# Patient Record
Sex: MALE | Race: WHITE | URBAN - METROPOLITAN AREA
[De-identification: names, ages, dates, MRNs, and addresses within clinical notes are randomized per-mention and may not be internally consistent; named-entity substitution may affect disease eponyms.]

---

## 2020-10-13 ENCOUNTER — OFFICE VISIT (OUTPATIENT)
Dept: PRIMARY CARE CLINIC | Age: 60
End: 2020-10-13
Payer: COMMERCIAL

## 2020-10-13 PROCEDURE — 99211 OFF/OP EST MAY X REQ PHY/QHP: CPT | Performed by: NURSE PRACTITIONER

## 2020-10-13 NOTE — PATIENT INSTRUCTIONS

## 2020-10-15 LAB
SARS-COV-2: DETECTED
SOURCE: ABNORMAL

## 2020-10-18 NOTE — PROGRESS NOTES
Nahum Marquez   62 y.o. male   1960    HPI:  Chief Complaint   Patient presents with    Established New Doctor    Nausea     Started 10/4/20.  Diarrhea    Fever     Low grade.  Concern For COVID-19    Covid Testing     Tested positive. Pt tested on Tuesday, 10/13. Virtual visit encounter type:   [x] Doxy. me  [] Telephone w/o video  [] MyChart  [] Other: This is patient's first visit with me to establish care as their new PCP. He has no significant PMH and has not had a PCP in a very long time. Of note, he tested positive for COVID-19 on 10/13/2020. He went to one of [facility]) COVID-19 testing centers. Has no idea where he contracted it from. Around 10/4/2020, he started feeling sick. He stated that around this time of the year, he has sinus issues and thought it was another episode. He reported of right side swollen gland of his neck and low grade fever of 99 F intermittently. Last week his symptoms worsened and started having higher fever of low 100 F, abdominal cramping, and non-bloody diarrhea.      -Has been fever free for about 4 days.  -Diarrhea also stopped around 4 days.  -Reported of losing 8 lbs    He overall feels much better with mild residual intermittent abdominal cramping. Despite his symptoms, he has been able to work from home. He works from [employer] Only person in his household is his wife and she tested negative for COVID-19 and had little to no symptoms. PDMP report revealed no controlled meds of any kind. No Known Allergies    Current Outpatient Medications on File Prior to Visit   Medication Sig Dispense Refill    fluticasone (FLONASE) 50 MCG/ACT nasal spray       halobetasol (ULTRAVATE) 0.05 % cream APPLY TWICE A DAY AS NEEDED **[facility] NOT CONTRACTED W/[facility]**       No current facility-administered medications on file prior to visit.        Family History   Problem Relation Age of Onset    Other Mother         obesity    High Blood discharge. Extraocular Movements: Extraocular movements intact. Conjunctiva/sclera: Conjunctivae normal.   Neck:      Musculoskeletal: Normal range of motion. No erythema. Pulmonary:      Effort: Pulmonary effort is normal. No respiratory distress. Breath sounds: No stridor. No wheezing. Abdominal:      General: There is no distension. Musculoskeletal: Normal range of motion. Skin:     Coloration: Skin is not cyanotic, jaundiced or pale. Findings: No erythema. Neurological:      General: No focal deficit present. Mental Status: He is alert and oriented to person, place, and time. Mental status is at baseline. Psychiatric:         Attention and Perception: Attention and perception normal.         Mood and Affect: Mood and affect normal.         Speech: Speech normal.         Behavior: Behavior normal. Behavior is cooperative. Thought Content: Thought content normal.        Assessment/Plan:       ICD-10-CM    1. Encounter to establish care with new doctor  Z76.89    2. COVID-19 virus infection  U07.1       -Patient is now essentially back to his baseline and is asymptomatic.  -Patient stated that he does not need a doctor's note to return to work. -Based on his clinical course, I informed patient that I doubt he will have residual/lingering symptoms that some patients have had, who were infected with COVID-19.  -Recommended getting flu shot next month or so when he's asymptomatic. Educational information was provided to the patient regarding the signs and symptoms associated with COVID-19. Patient was also made aware that COVID-19 is highly contagious and is mainly contracted by being in close proximity (via respiratory droplets) to an infected person with COVID-19 and that the virus can be transmitted even if one is asymptomatic. Patient was also made aware that the virus can be transmitted via oral/body secretions.   Patient was advised to wear a mask and gloves especially in large public settings (e.g. grocery store) and maintain social distancing (> 6 feet apart) as much as possible. Patient was informed if one has any symptoms consistent with COVID-19 were to develop or if one has been exposed to a person, who tested positive for COVID-19 to go to one of [facility]'s nearest flu clinics to be evaluated and possibly be tested for COVID-19 (based on the discretion of the healthcare evaluator) or seek care with their PCP (via telemedicine), urgent care, or ER. Results may take 3-5 days (or even longer) depending on where the patient was tested. The patient was advised to make arrangements with family and/or friends to obtaining basic necessities (eg groceries) and that it is recommended that no one visit within the household to prevent possible exposure and transmission to others. Based on current CDC guidelines, at least 24 hours of being afebrile as well as improvement of overall symptoms is highly recommended before returning to work and minimum of 7 days (or longer) has elapsed since symptoms first started. Patient was also informed that repeat COVID-19 testing for most people is not necessary. Regarding the recovery process, the patient was informed that each person is different and for those with mild presentation of COVID-19 may recover within 2 to 3 weeks of infection while there is with moderate and more severe cases of COVID-19 may take well over 3 weeks or even months to recover. The patient was encouraged to follow-up as directed with their PCP. 3. Diarrhea due to COVID-19  U07.1     A08.39     Patient mainly had GI symptoms, which have resolved. I reviewed the plan of care with the patient. Patient acknowledged understanding and agreed with plan of care overall.     Medications Discontinued During This Encounter   Medication Reason    Fexofenadine HCl (ALLEGRA PO) LIST CLEANUP    permethrin (ELIMITE) 5 % cream LIST CLEANUP        Patient was informed that whether starting or adding a new medication or increasing the dose of a current medication, the benefits and risks have to be considered and weighed over. Patient was also informed that there are no medications that have no side effects and if a side effect were to occur with starting a new medication or with increasing the dose of a current medication that either the medication can be totally discontinued altogether or simply decrease the dose of it and based on this, a follow-up appointment is necessary. The drug allergy list will then be updated with the corresponding side effects if it's deemed to be a true 'drug allergy'. The most common adverse effects of medication(s) were addressed at today's visit. Lastly, the patient was informed that the coverage status of a medication may vary from insurance to insurance and the only way to verify if the medication is covered is to send an actual prescription in. The patient was also informed that the cost of medications vary from insurance to insurance. Estimated length of time of today's visit: 30 minutes - discussed at length about COVID-19    PDMP Monitoring:   Last PDMP Son as Reviewed [facility]):  Review User Review Instant Review Result   [address]ls CHRISTIAN 10/18/2020  5:09 AM Reviewed PDMP [1]       Follow-up: Return if symptoms worsen or fail to improve. . Patient was informed that if his or her symptoms worsen to return here or go to nearest ER if symptoms significantly worsen. Disclaimer:  Cruz Powell is a 62 y.o. male is being evaluated by a virtual visit (video visit) and/or telephone (without video) encounter to address their concern(s) as mentioned above. Prior to arranging this appointment, the patient was made aware of the need and importance to schedule the visit in this manner given the current ongoing COVID-19 pandemic. The patient was also made aware that the telemedicine service used (e.g.doxy. me) would not save let alone record any information (audio, video, and text) regarding the actual virtual visit. After this discussion, the patient acknowledged understanding and agreed to today's virtual visit encounter. A caregiver was present when appropriate as well as an  if requested. Due to this being a TeleHealth encounter (during the KSGFY-09 public health emergency), evaluation of the following organ systems was overall limited: Vitals/Constitutional/EENT/Resp/CV/GI//MS/Neuro/Skin/Heme-Lymph-Imm. As a result, establishing a diagnosis(s) and formulating a plan of care may be overall more difficult and complicated compared to a conventional (face-to-face) office visit. The patient was made aware about the potential limitations and difficulties of a telemedicine visit such as having technical difficulties related to video and/or audio issues often stemming from a sub-optimal/poor Internet or cellular data connection and/or other factors. If this is judged to be the case then the patient would be informed if deemed relevant and necessary that an in-person follow-up visit may be recommended. Pursuant to the emergency declaration under the [address] [place] [address], [address] [place] [address] authority and the [employer] and [employer] Act, this virtual visit was conducted with the patient's (and/or legal guardian's) consent, to reduce the patient's risk (as well as others) of exposure to COVID-19 and to continue to maintain and provide necessary medical care. The patient (and/or legal guardian) has also been advised to contact this office for worsening conditions or problems, and seek emergency medical treatment and/or call 911 if deemed necessary. Services were provided through either a video synchronous discussion virtually or via telephone (without video) or combination of both to substitute for in-person clinic visit.  Patient and provider were located at their individual home(s) or their most convenient location at the time of visit. Hector Cerna MD on 10/20/2020 at 10:00 [address]    An electronic signature was used to authenticate this note.

## 2020-10-19 ENCOUNTER — VIRTUAL VISIT (OUTPATIENT)
Dept: FAMILY MEDICINE CLINIC | Age: 60
End: 2020-10-19
Payer: COMMERCIAL

## 2020-10-19 PROCEDURE — 99203 OFFICE O/P NEW LOW 30 MIN: CPT | Performed by: FAMILY MEDICINE

## 2020-10-19 RX ORDER — DIPHENOXYLATE HYDROCHLORIDE AND ATROPINE SULFATE 2.5; .025 MG/1; MG/1
1 TABLET ORAL EVERY 8 HOURS PRN
Qty: 15 TABLET | Refills: 0 | Status: CANCELLED | OUTPATIENT
Start: 2020-10-19 | End: 2020-10-24

## 2020-10-19 RX ORDER — DICYCLOMINE HYDROCHLORIDE 10 MG/1
10 CAPSULE ORAL EVERY 6 HOURS PRN
Qty: 30 CAPSULE | Refills: 2 | Status: CANCELLED | OUTPATIENT
Start: 2020-10-19

## 2020-10-19 RX ORDER — ERGOCALCIFEROL 1.25 MG/1
50000 CAPSULE ORAL WEEKLY
Qty: 4 CAPSULE | Refills: 0 | Status: CANCELLED | OUTPATIENT
Start: 2020-10-19

## 2020-10-19 RX ORDER — BENZONATATE 100 MG/1
100 CAPSULE ORAL 3 TIMES DAILY PRN
Qty: 15 CAPSULE | Refills: 1 | Status: CANCELLED | OUTPATIENT
Start: 2020-10-19

## 2020-10-19 RX ORDER — ALBUTEROL SULFATE 90 UG/1
2 AEROSOL, METERED RESPIRATORY (INHALATION) EVERY 6 HOURS PRN
Qty: 1 INHALER | Refills: 0 | Status: CANCELLED | OUTPATIENT
Start: 2020-10-19

## 2020-10-19 RX ORDER — FLUTICASONE PROPIONATE 50 MCG
SPRAY, SUSPENSION (ML) NASAL
COMMUNITY
Start: 1999-10-01

## 2020-10-19 ASSESSMENT — PATIENT HEALTH QUESTIONNAIRE - PHQ9
SUM OF ALL RESPONSES TO PHQ QUESTIONS 1-9: 0
2. FEELING DOWN, DEPRESSED OR HOPELESS: 0
SUM OF ALL RESPONSES TO PHQ QUESTIONS 1-9: 0
SUM OF ALL RESPONSES TO PHQ9 QUESTIONS 1 & 2: 0
1. LITTLE INTEREST OR PLEASURE IN DOING THINGS: 0
SUM OF ALL RESPONSES TO PHQ QUESTIONS 1-9: 0

## 2020-10-19 NOTE — PATIENT INSTRUCTIONS
Patient Education        10 Things to Do When You Have COVID-19    Stay home. Don't go to school, work, or public areas. And don't use public transportation, ride-shares, or taxis unless you have no choice. Leave your home only if you need to get medical care. But call the doctor's office first so they know you're coming. And wear a cloth face cover. Ask before leaving isolation. Talk with your doctor or other health professional about when it will be safe for you to leave isolation. Wear a cloth face cover when you are around other people. It can help stop the spread of the virus when you cough or sneeze. Limit contact with people in your home. If possible, stay in a separate bedroom and use a separate bathroom. Avoid contact with pets and other animals. If possible, have a friend or family member care for them while you're sick. Cover your mouth and nose with a tissue when you cough or sneeze. Then throw the tissue in the trash right away. Wash your hands often, especially after you cough or sneeze. Use soap and water, and scrub for at least 20 seconds. If soap and water aren't available, use an alcohol-based hand . Don't share personal household items. These include bedding, towels, cups and glasses, and eating utensils. Clean and disinfect your home every day. Use household  or disinfectant wipes or sprays. Take special care to clean things that you grab with your hands. These include doorknobs, remote controls, phones, and handles on your refrigerator and microwave. And don't forget countertops, tabletops, bathrooms, and computer keyboards. Take acetaminophen (Tylenol) to relieve fever and body aches. Read and follow all instructions on the label. Current as of: July 10, 2020               Content Version: 12.6  © 2006-2020 [employer]. Care instructions adapted under license by [facility].  If you have questions about a medical condition or this instruction, always ask your healthcare professional. [place] [address] any warranty or liability for your use of this information.

## 2020-10-20 ASSESSMENT — ENCOUNTER SYMPTOMS
COUGH: 0
RHINORRHEA: 0
ABDOMINAL PAIN: 1
TROUBLE SWALLOWING: 0
SINUS PRESSURE: 0
CHEST TIGHTNESS: 0
SHORTNESS OF BREATH: 0
CONSTIPATION: 0
WHEEZING: 0
VOMITING: 0
DIARRHEA: 0
ABDOMINAL DISTENTION: 0
NAUSEA: 0
BLOOD IN STOOL: 0
BACK PAIN: 0

## 2025-02-05 ENCOUNTER — HOSPITAL ENCOUNTER (EMERGENCY)
Age: 65
Discharge: LEFT BEFORE DISCHARGE | End: 2025-02-06
Attending: STUDENT IN AN ORGANIZED HEALTH CARE EDUCATION/TRAINING PROGRAM | Admitting: INTERNAL MEDICINE
Payer: COMMERCIAL

## 2025-02-05 ENCOUNTER — APPOINTMENT (OUTPATIENT)
Dept: CT IMAGING | Age: 65
End: 2025-02-05
Payer: COMMERCIAL

## 2025-02-05 VITALS
DIASTOLIC BLOOD PRESSURE: 94 MMHG | SYSTOLIC BLOOD PRESSURE: 126 MMHG | RESPIRATION RATE: 21 BRPM | OXYGEN SATURATION: 99 % | HEART RATE: 68 BPM | TEMPERATURE: 98.8 F

## 2025-02-05 DIAGNOSIS — K92.2 GASTROINTESTINAL HEMORRHAGE, UNSPECIFIED GASTROINTESTINAL HEMORRHAGE TYPE: Primary | ICD-10-CM

## 2025-02-05 LAB
ABO + RH BLD: NORMAL
ALBUMIN SERPL-MCNC: 3.9 G/DL (ref 3.4–5)
ALBUMIN/GLOB SERPL: 1.6 {RATIO} (ref 1.1–2.2)
ALP SERPL-CCNC: 54 U/L (ref 40–129)
ALT SERPL-CCNC: 13 U/L (ref 10–40)
ANION GAP SERPL CALCULATED.3IONS-SCNC: 7 MMOL/L (ref 3–16)
AST SERPL-CCNC: 22 U/L (ref 15–37)
BASOPHILS # BLD: 0.1 K/UL (ref 0–0.2)
BASOPHILS NFR BLD: 0.8 %
BILIRUB SERPL-MCNC: 0.5 MG/DL (ref 0–1)
BLD GP AB SCN SERPL QL: NORMAL
BUN SERPL-MCNC: 14 MG/DL (ref 7–20)
CALCIUM SERPL-MCNC: 8.9 MG/DL (ref 8.3–10.6)
CHLORIDE SERPL-SCNC: 106 MMOL/L (ref 99–110)
CO2 SERPL-SCNC: 28 MMOL/L (ref 21–32)
CREAT SERPL-MCNC: 0.7 MG/DL (ref 0.8–1.3)
DEPRECATED RDW RBC AUTO: 13.4 % (ref 12.4–15.4)
EOSINOPHIL # BLD: 0.2 K/UL (ref 0–0.6)
EOSINOPHIL NFR BLD: 1.3 %
GFR SERPLBLD CREATININE-BSD FMLA CKD-EPI: >90 ML/MIN/{1.73_M2}
GLUCOSE SERPL-MCNC: 112 MG/DL (ref 70–99)
HCT VFR BLD AUTO: 44.8 % (ref 40.5–52.5)
HEMOCCULT STL QL: ABNORMAL
HGB BLD-MCNC: 15 G/DL (ref 13.5–17.5)
INR PPP: 1.07 (ref 0.85–1.15)
LYMPHOCYTES # BLD: 1.9 K/UL (ref 1–5.1)
LYMPHOCYTES NFR BLD: 12.9 %
MCH RBC QN AUTO: 28.2 PG (ref 26–34)
MCHC RBC AUTO-ENTMCNC: 33.5 G/DL (ref 31–36)
MCV RBC AUTO: 84.2 FL (ref 80–100)
MONOCYTES # BLD: 1.2 K/UL (ref 0–1.3)
MONOCYTES NFR BLD: 8.2 %
NEUTROPHILS # BLD: 11 K/UL (ref 1.7–7.7)
NEUTROPHILS NFR BLD: 76.8 %
PLATELET # BLD AUTO: 235 K/UL (ref 135–450)
PMV BLD AUTO: 8.9 FL (ref 5–10.5)
POTASSIUM SERPL-SCNC: 4.4 MMOL/L (ref 3.5–5.1)
PROT SERPL-MCNC: 6.4 G/DL (ref 6.4–8.2)
PROTHROMBIN TIME: 14.1 SEC (ref 11.9–14.9)
RBC # BLD AUTO: 5.32 M/UL (ref 4.2–5.9)
SODIUM SERPL-SCNC: 141 MMOL/L (ref 136–145)
WBC # BLD AUTO: 14.4 K/UL (ref 4–11)

## 2025-02-05 PROCEDURE — 85025 COMPLETE CBC W/AUTO DIFF WBC: CPT

## 2025-02-05 PROCEDURE — 99285 EMERGENCY DEPT VISIT HI MDM: CPT

## 2025-02-05 PROCEDURE — 36415 COLL VENOUS BLD VENIPUNCTURE: CPT

## 2025-02-05 PROCEDURE — 2580000003 HC RX 258: Performed by: STUDENT IN AN ORGANIZED HEALTH CARE EDUCATION/TRAINING PROGRAM

## 2025-02-05 PROCEDURE — 1200000000 HC SEMI PRIVATE

## 2025-02-05 PROCEDURE — 6360000004 HC RX CONTRAST MEDICATION: Performed by: STUDENT IN AN ORGANIZED HEALTH CARE EDUCATION/TRAINING PROGRAM

## 2025-02-05 PROCEDURE — 86901 BLOOD TYPING SEROLOGIC RH(D): CPT

## 2025-02-05 PROCEDURE — 86850 RBC ANTIBODY SCREEN: CPT

## 2025-02-05 PROCEDURE — 74174 CTA ABD&PLVS W/CONTRAST: CPT

## 2025-02-05 PROCEDURE — 80053 COMPREHEN METABOLIC PANEL: CPT

## 2025-02-05 PROCEDURE — 86900 BLOOD TYPING SEROLOGIC ABO: CPT

## 2025-02-05 PROCEDURE — 85610 PROTHROMBIN TIME: CPT

## 2025-02-05 PROCEDURE — 82270 OCCULT BLOOD FECES: CPT

## 2025-02-05 RX ORDER — 0.9 % SODIUM CHLORIDE 0.9 %
1000 INTRAVENOUS SOLUTION INTRAVENOUS ONCE
Status: COMPLETED | OUTPATIENT
Start: 2025-02-05 | End: 2025-02-05

## 2025-02-05 RX ORDER — ONDANSETRON 2 MG/ML
4 INJECTION INTRAMUSCULAR; INTRAVENOUS EVERY 6 HOURS PRN
Status: DISCONTINUED | OUTPATIENT
Start: 2025-02-05 | End: 2025-02-05

## 2025-02-05 RX ORDER — SODIUM CHLORIDE 0.9 % (FLUSH) 0.9 %
5-40 SYRINGE (ML) INJECTION PRN
Status: DISCONTINUED | OUTPATIENT
Start: 2025-02-05 | End: 2025-02-05

## 2025-02-05 RX ORDER — SODIUM CHLORIDE 9 MG/ML
INJECTION, SOLUTION INTRAVENOUS PRN
Status: DISCONTINUED | OUTPATIENT
Start: 2025-02-05 | End: 2025-02-05

## 2025-02-05 RX ORDER — ACETAMINOPHEN 650 MG/1
650 SUPPOSITORY RECTAL EVERY 6 HOURS PRN
Status: DISCONTINUED | OUTPATIENT
Start: 2025-02-05 | End: 2025-02-05

## 2025-02-05 RX ORDER — SENNOSIDES A AND B 8.6 MG/1
1 TABLET, FILM COATED ORAL DAILY PRN
Status: DISCONTINUED | OUTPATIENT
Start: 2025-02-05 | End: 2025-02-05

## 2025-02-05 RX ORDER — POTASSIUM CHLORIDE 1500 MG/1
40 TABLET, EXTENDED RELEASE ORAL PRN
Status: DISCONTINUED | OUTPATIENT
Start: 2025-02-05 | End: 2025-02-05

## 2025-02-05 RX ORDER — ACETAMINOPHEN 325 MG/1
650 TABLET ORAL EVERY 6 HOURS PRN
Status: DISCONTINUED | OUTPATIENT
Start: 2025-02-05 | End: 2025-02-05

## 2025-02-05 RX ORDER — SODIUM CHLORIDE 0.9 % (FLUSH) 0.9 %
5-40 SYRINGE (ML) INJECTION EVERY 12 HOURS SCHEDULED
Status: DISCONTINUED | OUTPATIENT
Start: 2025-02-05 | End: 2025-02-05

## 2025-02-05 RX ORDER — POTASSIUM CHLORIDE 7.45 MG/ML
10 INJECTION INTRAVENOUS PRN
Status: DISCONTINUED | OUTPATIENT
Start: 2025-02-05 | End: 2025-02-05

## 2025-02-05 RX ORDER — MAGNESIUM SULFATE IN WATER 40 MG/ML
2000 INJECTION, SOLUTION INTRAVENOUS PRN
Status: DISCONTINUED | OUTPATIENT
Start: 2025-02-05 | End: 2025-02-05

## 2025-02-05 RX ADMIN — SODIUM CHLORIDE 1000 ML: 9 INJECTION, SOLUTION INTRAVENOUS at 19:28

## 2025-02-05 RX ADMIN — IOHEXOL 75 ML: 350 INJECTION, SOLUTION INTRAVENOUS at 19:37

## 2025-02-05 ASSESSMENT — LIFESTYLE VARIABLES
HOW OFTEN DO YOU HAVE A DRINK CONTAINING ALCOHOL: NEVER
HOW MANY STANDARD DRINKS CONTAINING ALCOHOL DO YOU HAVE ON A TYPICAL DAY: PATIENT DOES NOT DRINK

## 2025-02-06 NOTE — ED NOTES
Tod Stringer is a 64 y.o. male admitted for  Active Problems:    * No active hospital problems. *  Resolved Problems:    * No resolved hospital problems. *  .   Patient Home via EMS transportation with   Chief Complaint   Patient presents with    GI Problem     Pt via EMS from home, c.o bright red rectal bleeding starting this afternoon, states he started to feel lightheaded, endorses abdominal pain    .  Patient is alert and Person, Place, Time, and Situation  Patient's baseline mobility: Baseline Mobility: Independent   Code Status: No Order   Cardiac Rhythm:       Is patient on baseline Oxygen: no how many Liters:   Abnormal Assessment Findings: Rectacl bleeding     Isolation: None      NIH Score:    C-SSRS: Risk of Suicide: No Risk  Bedside swallow:        Active LDA's:   Peripheral IV 02/05/25 Distal;Right Cephalic (Active)     Patient admitted with a marin: no If the marin is chronic was it exchanged:   Reason for marin:   Patient admitted with Central Line:  . PICC line placement confirmed: YES OR NO:[id number]}   Reason for Central line:   Was central line Inserted from an outside facility:        Family/Caregiver Present yes Any Concerns: no   Restraints no  Sitter no         Vitals:      Vitals:    02/05/25 1807 02/05/25 2003 02/05/25 2043 02/05/25 2044   BP: (!) 142/102  (!) 140/88    Pulse: 68      Resp: 21      Temp: 97.5 °F (36.4 °C)      TempSrc: Oral      SpO2: 100% 98%  99%       Last documented pain score (0-10 scale)    Pain medication administered No.    Pertinent or High Risk Medications/Drips: No.    Pending Blood Product Administration: no    Abnormal labs:   Abnormal Labs Reviewed   CBC WITH AUTO DIFFERENTIAL - Abnormal; Notable for the following components:       Result Value    WBC 14.4 (*)     Neutrophils Absolute 11.0 (*)     All other components within normal limits   COMPREHENSIVE METABOLIC PANEL W/ REFLEX TO MG FOR LOW K - Abnormal; Notable for the following components:    Glucose 112 (*)

## 2025-02-06 NOTE — ED PROVIDER NOTES
[facility] EMERGENCY DEPARTMENT    CHIEF COMPLAINT  GI Problem (Pt via EMS from home, c.o bright red rectal bleeding starting this afternoon, states he started to feel lightheaded, endorses abdominal pain )       HISTORY OF PRESENT ILLNESS  Tod Stringer is a 64 y.o. male presenting to the ED for rectal bleeding.  Onset of rectal bleeding started today.  Patient states he has no previous treatment of GI illnesses or GI bleeding.  Patient denies being on blood thinners.  Patient states he has had 2 large bowel movements that were consistent with bright red blood, patient states he filled up the toilet bowl water with bright red blood.  Patient denies any history of hemorrhoids.  Patient denies abdominal pain.  Patient states he felt lightheaded after bowel movements.  When EMS arrived to the scene patient states his systolic blood pressure was in the 70s.  Patient was brought to the emergency department for further evaluation.    - History obtained from: Patient   - Limitations to history: none    I have reviewed the following from the nursing documentation:    Past Medical History:   Diagnosis Date    Allergic rhinitis     COVID-19     Elevated cholesterol     Fx clavicle 1967    left    Headache(784.0)     was daily     Past Surgical History:   Procedure Laterality Date    INGUINAL HERNIA REPAIR Left 2009    TONSILLECTOMY AND ADENOIDECTOMY  1971    VASECTOMY  1987    WISDOM TOOTH EXTRACTION  1985     Family History   Problem Relation Age of Onset    Other Mother         obesity    High Blood Pressure Mother     Diabetes Mother     Prostate Cancer Father     Kidney Cancer Father     Hypertension Other         both sets of grandparents    Diabetes Other         both sets of grandparents    Heart Attack Maternal Grandfather     Stroke Maternal Grandfather      Social History     Socioeconomic History    Marital status:      Spouse name: natanael    Number of children: 2    Years of education: Not on file

## 2025-02-06 NOTE — DISCHARGE INSTRUCTIONS
Please follow with GI doctor.  If you have worsening GI bleeding please go back to the emergency department.

## 2025-02-06 NOTE — ED NOTES
Pt wanting to be discharged with GI followup.  Talked with MD.  The patient would have to leave AMA with follow up.  Spoke with patient and he was okay signing out AMA and following up with GI

## 2025-02-07 ENCOUNTER — HOSPITAL ENCOUNTER (OUTPATIENT)
Age: 65
Discharge: HOME | End: 2025-02-07
Payer: COMMERCIAL

## 2025-02-07 LAB — C DIFF TOX A+B STL QL IA: NORMAL

## 2025-02-07 PROCEDURE — 87506 IADNA-DNA/RNA PROBE TQ 6-11: CPT

## 2025-02-07 PROCEDURE — 87449 NOS EACH ORGANISM AG IA: CPT

## 2025-02-07 PROCEDURE — 87324 CLOSTRIDIUM AG IA: CPT

## 2025-02-09 LAB — GI PATHOGENS PNL STL NAA+PROBE: NORMAL
